# Patient Record
Sex: MALE | Race: WHITE | NOT HISPANIC OR LATINO | URBAN - METROPOLITAN AREA
[De-identification: names, ages, dates, MRNs, and addresses within clinical notes are randomized per-mention and may not be internally consistent; named-entity substitution may affect disease eponyms.]

---
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
## 2021-03-01 ENCOUNTER — OFFICE VISIT (OUTPATIENT)
Dept: PHYSICAL THERAPY | Facility: OTHER | Age: 21
End: 2021-03-01
Payer: COMMERCIAL

## 2021-03-01 DIAGNOSIS — M67.952 TENDINOPATHY OF LEFT GLUTEUS MEDIUS: Primary | ICD-10-CM

## 2021-03-01 PROCEDURE — 97110 THERAPEUTIC EXERCISES: CPT | Performed by: PHYSICAL THERAPIST

## 2021-03-01 PROCEDURE — 97162 PT EVAL MOD COMPLEX 30 MIN: CPT | Performed by: PHYSICAL THERAPIST

## 2021-03-01 NOTE — PROGRESS NOTES
PT Evaluation     Today's date: 3/1/2021  Patient name: Tito Parra  : 2000  MRN: [id number]  Referring provider: Vipul Zambrano PT  Dx:   Encounter Diagnosis     ICD-10-CM    1  Tendinopathy of left gluteus medius  M67 952        Start Time: 8800  Stop Time: 0110  Total time in clinic (min): 60 minutes    Assessment  Assessment details: Tito Parra is a pleasant 23 y o  male who presents with L hip pain, signs and symptoms consistent with glut med tendinipoathy  Current pathology liekly secondary to poor glut and core strength, decreased LE flexibility, and decreased TCJ mobility leading to altered gait mechanics (ER LE with medial heel whip) while running  The patients altered gait mechanics secondary to impairments listed above is likely the cause of his current hip pain and glut tendinopathy  The patient's greatest concerns are worry over not knowing what's wrong, concern at no signs of improvement, fear of not being able to keep active and future ill health (and wanting to prevent it)  The primary movement problem is L sided hip pain with movement coordination impairments limiting his ability to exercise or recreation and walk  No further referral appears necessary at this time based upon examination results  Primary Impairments:  1) decreased glut and core strength  2) decreased LE flexibility  3) hip pain        Impairments: abnormal gait, abnormal muscle firing, abnormal or restricted ROM, abnormal movement, activity intolerance, impaired physical strength, lacks appropriate home exercise program, pain with function and poor body mechanics    Symptom irritability: moderateUnderstanding of Dx/Px/POC: good   Prognosis: good  Prognosis details: Positive prognostic indicators include positive attitude toward recovery  Negative prognostic indicators include chronicity of symptoms, high symptom irritability        Goals  STG's to be achieved in 4 weeks:  1) Patient will have normal pain free AROM of L hip  2) Patient will improve hip and core strength by 1/2 muscle grade  3) Patient will be able to complete easy distance running 3-5 miles, 3 days per week with no greater than 2/10 hip pain  LTG's to be achieved in 8 weeks:  1) Patient will be independent and compliant with HEP  2) Patient will return to speed workouts and full training with the team 6 days per week with no greater than 2/10 hip pain  3) Patient will return to competition, running 8K with no greater than 2/10 hip pain  4) Patient will score 75 or greater on FOTO  Plan  Patient would benefit from: skilled physical therapy  Planned modality interventions: thermotherapy: hydrocollator packs  Planned therapy interventions: activity modification, joint mobilization, manual therapy, motor coordination training, neuromuscular re-education, patient education, self care, therapeutic activities, therapeutic exercise, graded activity, home exercise program and behavior modification  Frequency: 2x week  Duration in weeks: 8  Treatment plan discussed with: patient        Subjective Evaluation    History of Present Illness  Date of onset: 2/12/2021  Mechanism of injury: 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  Hung Mercer reports a chronic history if L sided LE pain  States that throughout his high school career he was relatively injury free  But, notes 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 he has had an increasing number of L sided LE overuse injuries  Reports nagging ITB issues, nagging hip and knee pain  Reports with the pandemic he took 2 months off from running to do nothing which helped his previous injuries  His most recent injury occurred approximately 3 weeks ago  He took 2 weeks off cross-training and noted little to no improvement in hip ITB pain  Patient reports he not been able to run more than 1 mile since injury   States that pain is still there when running a 1/2 mile a mile or more and is worried about injuring his hip further  Pain  Current pain ratin  At best pain ratin  At worst pain ratin          Objective     Palpation   Left   Tenderness of the gluteus medius  Active Range of Motion   Left Ankle/Foot   Dorsiflexion (ke): 5 degrees   Inversion: WFL  Eversion: WFL    Right Ankle/Foot   Dorsiflexion (ke): 5 degrees   Plantar flexion: with pain  Inversion: with pain  Eversion: with pain    Joint Play   Left Hip   Hypomobile in the posterior hip capsule, anterior hip capsule and lateral hip capsule  Right Hip     Hypomobile in the posterior hip capsule, anterior hip capsule and lateral hip capsule  Left Ankle/Foot  Hypomobile in the talocrural joint  Strength/Myotome Testing     Left Hip     Isolated Muscles   Gluteus ashley: 4-  Gluteus medius: 3-  Iliopsoas: 4    Right Hip     Isolated Muscles   Gluteus maximums: 4-  Gluteus medius: 3+  Iliopsoas: 4    Left Ankle/Foot   Dorsiflexion: 5  Plantar flexion: 5  Inversion: 5  Eversion: 5    Right Ankle/Foot   Dorsiflexion: 5  Plantar flexion: 5  Inversion: 5  Eversion: 5    Additional Strength Details  Patient unable to hold glut med strength testing against gravity- when placed in position reports attempting to hold against gravity reproduces his pain       General Comments:      Hip Comments   Running analysis:   Posterior view- excessive vertical displacement, trendelenburg b/l, knee valgus, ER lower extremity, medial heel whip  Lateral view- lack of DF and ankle lean,       Flowsheet Rows      Most Recent Value   PT/OT G-Codes   Current Score  59   Projected Score  82             Precautions: chronic R hip pain      Manuals 3/1            TCJ mob post             Hip IR and flexion mob             Cupping glut med                          Neuro Re-Ed             Single leg hip abd isometric 10 x 10"            Side plank with hip abduction             Sidestep CLX band             SL squat                                                    Ther Ex             bike             Piriformis stretch 4 x 30"            Sciatic nerve glides 10 x 5"            Self DF mobilization 10 x 10"                                                                             Ther Activity                                       Gait Training                                       Modalities

## 2021-03-04 ENCOUNTER — OFFICE VISIT (OUTPATIENT)
Dept: PHYSICAL THERAPY | Facility: OTHER | Age: 21
End: 2021-03-04
Payer: COMMERCIAL

## 2021-03-04 DIAGNOSIS — M67.952 TENDINOPATHY OF LEFT GLUTEUS MEDIUS: Primary | ICD-10-CM

## 2021-03-04 PROCEDURE — 97110 THERAPEUTIC EXERCISES: CPT | Performed by: PHYSICAL THERAPIST

## 2021-03-04 PROCEDURE — 97112 NEUROMUSCULAR REEDUCATION: CPT | Performed by: PHYSICAL THERAPIST

## 2021-03-04 PROCEDURE — 97140 MANUAL THERAPY 1/> REGIONS: CPT | Performed by: PHYSICAL THERAPIST

## 2021-03-04 NOTE — PROGRESS NOTES
Daily Note     Today's date: 3/4/2021  Patient name: Nikunj Means  : 2000  MRN: [id number]  Referring provider: Kevin Smith PT  Dx:   Encounter Diagnosis     ICD-10-CM    1  Tendinopathy of left gluteus medius  M67 952        Start Time:   Stop Time: 1630  Total time in clinic (min): 60 minutes  1 on 1 from 330-400, not billed remainder    Subjective: Patient reports compliance with home program  Reports he began running on the treadmill and completing a ten minute run then biking for the rest of his workout  Reports tightness returns in his hip and he is unable to run further than 10 minutes at this time  But, does note improvemen      Objective: See treatment diary below      Assessment: Tolerated treatment well  Patient demonstrated fatigue post treatment and would benefit from continued PT  Patient very easily fatigued with current program  Continued with EPAT  Plan: Continue per plan of care        Precautions: chronic R hip pain      Manuals 3/1 3/4           TCJ mob post             Hip IR and flexion mob  EB           Cupping glut med                          Neuro Re-Ed             Single leg hip abd isometric 10 x 10" 10 x 10"           Side plank with hip abduction  3 x 20" from knee           Sidestep CLX band             SL squat                                                    Ther Ex             bike             Piriformis stretch 4 x 30" 4 x 30"           Sciatic nerve glides 10 x 5" 10 x 5"           Self DF mobilization 10 x 10" 4 x 30"                                                                            Ther Activity                                       Gait Training                                       Modalities

## 2021-03-08 ENCOUNTER — OFFICE VISIT (OUTPATIENT)
Dept: PHYSICAL THERAPY | Facility: OTHER | Age: 21
End: 2021-03-08
Payer: COMMERCIAL

## 2021-03-08 DIAGNOSIS — M67.952 TENDINOPATHY OF LEFT GLUTEUS MEDIUS: Primary | ICD-10-CM

## 2021-03-08 PROCEDURE — 97112 NEUROMUSCULAR REEDUCATION: CPT | Performed by: PHYSICAL THERAPIST

## 2021-03-08 PROCEDURE — 97140 MANUAL THERAPY 1/> REGIONS: CPT | Performed by: PHYSICAL THERAPIST

## 2021-03-08 PROCEDURE — 97110 THERAPEUTIC EXERCISES: CPT | Performed by: PHYSICAL THERAPIST

## 2021-03-08 NOTE — PROGRESS NOTES
Daily Note     Today's date: 3/8/2021  Patient name: Carina Dela Cruz  : 2000  MRN: [id number]  Referring provider: Gianna Mars PT  Dx:   Encounter Diagnosis     ICD-10-CM    1  Tendinopathy of left gluteus medius  M67 952        Start Time: 47  Stop Time: 8060  Total time in clinic (min): 60 minutes  1 on 1 from [id number], not billed remainder    Subjective: Patient reports he continues to have tightness at approximately 1 mile terry of his run  Patient states he feels he should continue with cross training at this point to reduce risk of further aggravating his hip pain  Objective: See treatment diary below      Assessment: Tolerated treatment well  Patient demonstrated fatigue post treatment and would benefit from continued PT  Patient very easily fatigued with current program  Continued with EPAT  Hypomobility noted in throughout L LE (foot- hip)  Patient would benefit from continued PT to improve upon impairments listed above 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  Plan: Continue per plan of care        Precautions: chronic R hip pain      Manuals 3/1 3/4 3/8          TCJ mob post   EB          Hip IR and flexion mob  EB EB          Cupping glut med                          Neuro Re-Ed             Single leg hip abd isometric 10 x 10" 10 x 10" 5 x 20"          Side plank with hip abduction  3 x 20" from knee 4 x 20"          Sidestep CLX band             SL squat, barefoot, short foot   3 x 10          SL RDL, barefoot, short foot   3 x 10                                    Ther Ex             bike   10'          Piriformis stretch 4 x 30" 4 x 30" 4 x 30"          Sciatic nerve glides 10 x 5" 10 x 5" 4 x 30"          Self DF mobilization 10 x 10" 4 x 30" 4 x 30"                                                                           Ther Activity                                       Gait Training                                       Modalities

## 2021-03-11 ENCOUNTER — OFFICE VISIT (OUTPATIENT)
Dept: PHYSICAL THERAPY | Facility: OTHER | Age: 21
End: 2021-03-11
Payer: COMMERCIAL

## 2021-03-11 DIAGNOSIS — M67.952 TENDINOPATHY OF LEFT GLUTEUS MEDIUS: Primary | ICD-10-CM

## 2021-03-11 PROCEDURE — 97110 THERAPEUTIC EXERCISES: CPT | Performed by: PHYSICAL THERAPIST

## 2021-03-11 PROCEDURE — 97112 NEUROMUSCULAR REEDUCATION: CPT | Performed by: PHYSICAL THERAPIST

## 2021-03-11 PROCEDURE — 97140 MANUAL THERAPY 1/> REGIONS: CPT | Performed by: PHYSICAL THERAPIST

## 2021-03-11 NOTE — PROGRESS NOTES
Daily Note     Today's date: 3/11/2021  Patient name: Tito Parra  : 2000  MRN: [id number]  Referring provider: Vipul Zambrano PT  Dx:   Encounter Diagnosis     ICD-10-CM    1  Tendinopathy of left gluteus medius  M67 952        Start Time:   Stop Time: 1726  Total time in clinic (min): 71 minutes  1 on 1 from 425-450, not billed remainder    Subjective: Patient reports he was able to complete a modified workout of a 1 mile warm up and a 2 mile and 1 mile workout  Patient reports he did not complete the remainder of the 2 mile repeats due to onset of hip pain  But, notes this is the best he has felt since starting PT  Objective: See treatment diary below      Assessment: Tolerated treatment well  Patient demonstrated fatigue post treatment and would benefit from continued PT  Patient very easily fatigued with current program  Continued with EPAT  Hypomobility noted in throughout L LE (foot- hip)  Patient would benefit from continued PT to improve upon impairments listed above 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  Plan: Continue per plan of care        Precautions: chronic R hip pain      Manuals 3/1 3/4 3/8 3/11         TCJ mob post   EB EB         Hip IR and flexion mob  EB EB EB         Cupping glut med                          Neuro Re-Ed             Single leg hip abd isometric 10 x 10" 10 x 10" 5 x 20" 5 x 20"         Side plank with hip abduction  3 x 20" from knee 4 x 20" 4 x 20"         Sidestep CLX band    4 laps black         SL squat, barefoot, short foot   3 x 10 3 x 10         SL RDL, barefoot, short foot   3 x 10 3 x 10          [facility]  to 8 in step    4 laps                      Ther Ex             bike   10' 7'          Piriformis stretch 4 x 30" 4 x 30" 4 x 30" 4 x 30"         Sciatic nerve glides 10 x 5" 10 x 5" 4 x 30" 4 x 30"         Self DF mobilization 10 x 10" 4 x 30" 4 x 30" 4 x 30" Ther Activity                                       Gait Training                                       Modalities

## 2021-03-15 ENCOUNTER — OFFICE VISIT (OUTPATIENT)
Dept: PHYSICAL THERAPY | Facility: OTHER | Age: 21
End: 2021-03-15
Payer: COMMERCIAL

## 2021-03-15 DIAGNOSIS — M67.952 TENDINOPATHY OF LEFT GLUTEUS MEDIUS: Primary | ICD-10-CM

## 2021-03-15 PROCEDURE — 97110 THERAPEUTIC EXERCISES: CPT | Performed by: PHYSICAL THERAPIST

## 2021-03-15 PROCEDURE — 97112 NEUROMUSCULAR REEDUCATION: CPT | Performed by: PHYSICAL THERAPIST

## 2021-03-15 PROCEDURE — 97140 MANUAL THERAPY 1/> REGIONS: CPT | Performed by: PHYSICAL THERAPIST

## 2021-03-15 NOTE — PROGRESS NOTES
Daily Note     Today's date: 3/15/2021  Patient name: Lucrecia Saleh  : 2000  MRN: [id number]  Referring provider: Mario Gonzalez, PT  Dx:   Encounter Diagnosis     ICD-10-CM    1  Tendinopathy of left gluteus medius  M67 952        Start Time:   Stop Time: 8819  Total time in clinic (min): 60 minutes  1 on 1 from 425-500pm, not billed remainder    Subjective: Patient reports he was able to complete a modified workout of a 1 mile warm up and a 2 mile and 1 mile workout  Patient reports he did not complete the remainder of the 2 mile repeats due to onset of hip pain  But, notes this is the best he has felt since starting PT  Objective: See treatment diary below      Assessment: Tolerated treatment well  Patient demonstrated fatigue post treatment and would benefit from continued PT  Patient appropriately challenged with current POC  Able to progress hold times for planks and SL hip abduction isometric  Patient very fatigued with hip progression  Continued with EPAT  Hypomobility noted in throughout L LE (foot- hip)  Patient would benefit from continued PT to improve upon impairments listed above 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  Plan: Continue per plan of care        Precautions: chronic R hip pain      Manuals 3/1 3/4 3/8 3/11 3/15        TCJ mob post   EB EB EB        Hip IR and flexion mob  EB EB EB EB        Cupping glut med                          Neuro Re-Ed             Single leg hip abd isometric 10 x 10" 10 x 10" 5 x 20" 5 x 20" 4 x 30"        Side plank with hip abduction  3 x 20" from knee 4 x 20" 4 x 20" 4 x 25"        Sidestep CLX band    4 laps black 4 laps black        SL squat, barefoot, short foot   3 x 10 3 x 10 3 x 10        SL RDL, barefoot, short foot   3 x 10 3 x 10  3 x 10        [facility]  to 8 in step    4 laps 5 laps                     Ther Ex             bike   10' 7'  10'         Piriformis stretch 4 x 30" 4 x 30" 4 x 30" 4 x 30" 4 x 30"        Sciatic nerve glides 10 x 5" 10 x 5" 4 x 30" 4 x 30" 4 x 30"        Self DF mobilization 10 x 10" 4 x 30" 4 x 30" 4 x 30" 4 x 30"                                                                         Ther Activity                                       Gait Training                                       Modalities

## 2021-03-18 ENCOUNTER — OFFICE VISIT (OUTPATIENT)
Dept: PHYSICAL THERAPY | Facility: OTHER | Age: 21
End: 2021-03-18
Payer: COMMERCIAL

## 2021-03-18 DIAGNOSIS — M67.952 TENDINOPATHY OF LEFT GLUTEUS MEDIUS: Primary | ICD-10-CM

## 2021-03-18 PROCEDURE — 97110 THERAPEUTIC EXERCISES: CPT | Performed by: PHYSICAL THERAPIST

## 2021-03-18 PROCEDURE — 97140 MANUAL THERAPY 1/> REGIONS: CPT | Performed by: PHYSICAL THERAPIST

## 2021-03-18 PROCEDURE — 97112 NEUROMUSCULAR REEDUCATION: CPT | Performed by: PHYSICAL THERAPIST

## 2021-03-18 NOTE — PROGRESS NOTES
Daily Note     Today's date: 3/18/2021  Patient name: Carina Dela Cruz  : 2000  MRN: [id number]  Referring provider: Gianna Mars, PT  Dx:   Encounter Diagnosis     ICD-10-CM    1  Tendinopathy of left gluteus medius  M67 952        Start Time: 3491  Stop Time: 1730  Total time in clinic (min): 75 minutes  1 on 1 from 428-452pm, not billed remainder    Subjective: Patient reports he completed a distance run of 20-30 min 2 x this week  States that he took yesterday off  But, overall is noting significant improvement of hip pain  Objective: See treatment diary below      Assessment: Tolerated treatment well  Patient demonstrated fatigue post treatment and would benefit from continued PT  Patient appropriately challenged with current POC  Able to progress hold times for planks and SL hip abduction isometric  Patient very fatigued with hip progression  Continued with EPAT  Hypomobility noted in throughout L LE (foot- hip)  Patient would benefit from continued PT to improve upon impairments listed above 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  Plan: Continue per plan of care        Precautions: chronic R hip pain      Manuals 3/1 3/4 3/8 3/11 3/15 3/18       TCJ mob post   EB EB EB EB       Hip IR and flexion mob  EB EB EB EB EB       Cupping glut med                          Neuro Re-Ed             Single leg hip abd isometric 10 x 10" 10 x 10" 5 x 20" 5 x 20" 4 x 30" 4 x 30"       Side plank with hip abduction  3 x 20" from knee 4 x 20" 4 x 20" 4 x 25" 4 x 25"       Sidestep CLX band    4 laps black 4 laps black 4 laps black       SL squat, barefoot, short foot   3 x 10 3 x 10 3 x 10 3 x 10, 10#       SL RDL, barefoot, short foot   3 x 10 3 x 10  3 x 10 3 x 10, 10#       [facility]  to 8 in step    4 laps 5 laps 5 laps       Elevated quadruped pull through      3 x 5 10#       Ther Ex             bike   10' 7'  10'  10'        Piriformis stretch 4 x 30" 4 x 30" 4 x 30" 4 x 30" 4 x 30" 4 x30"       Sciatic nerve glides 10 x 5" 10 x 5" 4 x 30" 4 x 30" 4 x 30" 4 x 30"       Self DF mobilization 10 x 10" 4 x 30" 4 x 30" 4 x 30" 4 x 30" 4 x 30"                                                                        Ther Activity                                       Gait Training                                       Modalities

## 2021-03-22 ENCOUNTER — OFFICE VISIT (OUTPATIENT)
Dept: PHYSICAL THERAPY | Facility: OTHER | Age: 21
End: 2021-03-22
Payer: COMMERCIAL

## 2021-03-22 DIAGNOSIS — M67.952 TENDINOPATHY OF LEFT GLUTEUS MEDIUS: Primary | ICD-10-CM

## 2021-03-22 PROCEDURE — 97140 MANUAL THERAPY 1/> REGIONS: CPT | Performed by: PHYSICAL THERAPIST

## 2021-03-22 PROCEDURE — 97112 NEUROMUSCULAR REEDUCATION: CPT | Performed by: PHYSICAL THERAPIST

## 2021-03-22 PROCEDURE — 97110 THERAPEUTIC EXERCISES: CPT | Performed by: PHYSICAL THERAPIST

## 2021-03-22 NOTE — PROGRESS NOTES
Daily Note     Today's date: 3/22/2021  Patient name: Manolo Canela  : 2000  MRN: [id number]  Referring provider: Kb Celis PT  Dx:   Encounter Diagnosis     ICD-10-CM    1  Tendinopathy of left gluteus medius  M67 952        Start Time:   Stop Time: 1231  Total time in clinic (min): 50 minutes  1 on 1 from 430-500pm, not billed remainder    Subjective: Patient reports 95% improvement in hip pain  Reports he was able to complete a speed workout without any difficulty  Reports he is eager to try a workout today  Plans to race this  Feel confident in d/c to HEP at this time    Objective: See treatment diary below      Assessment: Tolerated treatment well  Patient demonstrated fatigue post treatment and would benefit from continued PT  Patient appropriately challenged with current POC  Patient with improved gait mechanics, less toeing out, no overstriding, no trunk lean, pain free  Patient with good tolerance to current program and independent with current program  Discussed importance of continued mobility of hip and ankle and continued strengthening of glut and core to prevent future injury  Plan: Continue per plan of care        Precautions: chronic R hip pain      Manuals 3/1 3/4 3/8 3/11 3/15 3/18 3/22      TCJ mob post   EB EB EB EB EB      Hip IR and flexion mob  EB EB EB EB EB EB      Cupping glut med                          Neuro Re-Ed             Single leg hip abd isometric 10 x 10" 10 x 10" 5 x 20" 5 x 20" 4 x 30" 4 x 30" 4 x 30"      Side plank with hip abduction  3 x 20" from knee 4 x 20" 4 x 20" 4 x 25" 4 x 25" 4 x 25"      Sidestep CLX band    4 laps black 4 laps black 4 laps black 4 laps black      SL squat, barefoot, short foot   3 x 10 3 x 10 3 x 10 3 x 10, 10# 3 x 10, 10#      SL RDL, barefoot, short foot   3 x 10 3 x 10  3 x 10 3 x 10, 10# 3 x 10, 10#      [facility]  to 8 in step    4 laps 5 laps 5 laps 5 laps      Elevated quadruped pull through      3 x 5 10# 3 x5 10#      Ther Ex             bike   10' 7'  10'  10'  10'      Piriformis stretch 4 x 30" 4 x 30" 4 x 30" 4 x 30" 4 x 30" 4 x30" 4 x 30"      Sciatic nerve glides 10 x 5" 10 x 5" 4 x 30" 4 x 30" 4 x 30" 4 x 30" 10 x 10"      Self DF mobilization 10 x 10" 4 x 30" 4 x 30" 4 x 30" 4 x 30" 4 x 30" 4 x 30"                                                                       Ther Activity                                       Gait Training                                       Modalities

## 2021-03-25 ENCOUNTER — APPOINTMENT (OUTPATIENT)
Dept: PHYSICAL THERAPY | Facility: OTHER | Age: 21
End: 2021-03-25
Payer: COMMERCIAL

## 2021-03-29 ENCOUNTER — APPOINTMENT (OUTPATIENT)
Dept: PHYSICAL THERAPY | Facility: OTHER | Age: 21
End: 2021-03-29
Payer: COMMERCIAL

## 2021-12-29 ENCOUNTER — IMMUNIZATIONS (OUTPATIENT)
Dept: FAMILY MEDICINE CLINIC | Facility: HOSPITAL | Age: 21
End: 2021-12-29

## 2021-12-29 DIAGNOSIS — Z23 ENCOUNTER FOR IMMUNIZATION: Primary | ICD-10-CM

## 2021-12-29 PROCEDURE — 0064A COVID-19 MODERNA VACC 0.25 ML BOOSTER: CPT

## 2021-12-29 PROCEDURE — 91306 COVID-19 MODERNA VACC 0.25 ML BOOSTER: CPT

## 2023-06-21 ENCOUNTER — OFFICE VISIT (OUTPATIENT)
Dept: URGENT CARE | Age: 23
End: 2023-06-21
Payer: COMMERCIAL

## 2023-06-21 VITALS
HEART RATE: 51 BPM | DIASTOLIC BLOOD PRESSURE: 80 MMHG | RESPIRATION RATE: 16 BRPM | SYSTOLIC BLOOD PRESSURE: 140 MMHG | TEMPERATURE: 98.4 F | OXYGEN SATURATION: 98 %

## 2023-06-21 DIAGNOSIS — K64.9 HEMORRHOIDS, UNSPECIFIED HEMORRHOID TYPE: Primary | ICD-10-CM

## 2023-06-21 PROCEDURE — S9083 URGENT CARE CENTER GLOBAL: HCPCS

## 2023-06-21 PROCEDURE — G0382 LEV 3 HOSP TYPE B ED VISIT: HCPCS

## 2023-06-21 NOTE — PATIENT INSTRUCTIONS
Please trial OTC topical such as Preparation-H, Witch Hazel Extract  Follow up with PCP as discussed

## 2023-06-21 NOTE — PROGRESS NOTES
[facility]        NAME: Amber Peoples is a 24 y o  male  : 2000    MRN: [id number]  DATE: 2023  TIME: 6:15 PM    Assessment and Plan   Hemorrhoids, unspecified hemorrhoid type [K64 9]  1  Hemorrhoids, unspecified hemorrhoid type  Ambulatory Referral to Family Practice      Please trial OTC topical such as Preparation-H, [employer]  Follow up with PCP as discussed  Patient Instructions   Hemorrhoids   WHAT YOU NEED TO KNOW:   Hemorrhoids are swollen blood vessels inside your rectum (internal hemorrhoids) or on your anus (external hemorrhoids)  Sometimes a hemorrhoid may prolapse  This means it extends out of your anus  DISCHARGE INSTRUCTIONS:   Return to the emergency department if:   • You have severe pain in your rectum or around your anus      • You have severe pain in your abdomen and you are vomiting       • You have bleeding from your anus that soaks through your underwear      Contact your healthcare provider if:   • You have frequent and painful bowel movements      • Your hemorrhoid looks or feels more swollen than usual       • You do not have a bowel movement for 2 days or more       • You see or feel tissue coming through your anus       • You have questions or concerns about your condition or care      Medicines: You may  need any of the following:  • A pad, cream, or ointment  can help decrease pain, swelling, and itching       • Stool softeners  help treat or prevent constipation       • NSAIDs , such as ibuprofen, help decrease swelling, pain, and fever  NSAIDs can cause stomach bleeding or kidney problems in certain people  If you take blood thinner medicine, always ask your healthcare provider if NSAIDs are safe for you  Always read the medicine label and follow directions      • Take your medicine as directed  Contact your healthcare provider if you think your medicine is not helping or if you have side effects   Tell your provider if you are allergic to any medicine  Keep a list of the medicines, vitamins, and herbs you take  Include the amounts, and when and why you take them  Bring the list or the pill bottles to follow-up visits  Carry your medicine list with you in case of an emergency      Manage your symptoms:   • Apply ice on your anus for 15 to 20 minutes every hour or as directed  Use an ice pack, or put crushed ice in a plastic bag  Cover it with a towel before you apply it to your anus  Ice helps prevent tissue damage and decreases swelling and pain      • Take a sitz bath  Fill a bathtub with 4 to 6 inches of warm water  You may also use a sitz bath pan that fits inside a toilet bowl  Sit in the sitz bath for 15 minutes  Do this 3 times a day, and after each bowel movement  The warm water can help decrease pain and swelling       • Keep your anal area clean  Gently wash the area with warm water daily  Soap may irritate the area  After a bowel movement, wipe with moist towelettes or wet toilet paper  Dry toilet paper can irritate the area      Prevent hemorrhoids:   • Do not strain to have a bowel movement  Do not sit on the toilet too long  These actions can increase pressure on the tissues in your rectum and anus       • Drink plenty of liquids  Liquids can help prevent constipation  Ask how much liquid to drink each day and which liquids are best for you       • Eat a variety of high-fiber foods  Examples include fruits, vegetables, and whole grains  Ask your healthcare provider how much fiber you need each day  You may need to take a fiber supplement           • Exercise as directed  Exercise, such as walking, may make it easier to have a bowel movement  Ask your healthcare provider to help you create an exercise plan       • Do not have anal sex  Anal sex can weaken the skin around your rectum and anus       • Avoid heavy lifting    This can cause straining and increase your risk for another hemorrhoid      Follow up with your doctor as directed:  Write down your questions so you remember to ask them during your visits  © Marilyn [employer] 2022 Information is for End User's use only and may not be sold, redistributed or otherwise used for commercial purposes  The above information is an  only  It is not intended as medical advice for individual conditions or treatments  Talk to your doctor, nurse or pharmacist before following any medical regimen to see if it is safe and effective for you  Follow up with PCP in 3-5 days  Proceed to  ER if symptoms worsen  Chief Complaint   No chief complaint on file  History of Present Illness       Patient is a 21-year-old male with no significant past medical history who presents for evaluation of possible hemorrhoid  He reports that a week ago he was lifting some heavy furniture while moving and about an hour later while in the shower he noticed a lump around his rectum  He does report that  he has had some mild discomfort during bowel movements but otherwise denies rectal pain, pruritus, bleeding  He denies any recent insertional  activity  He reports bowel movements are regular and denies constipation  He has not tried anything for his symptoms  Review of Systems   Review of Systems   Constitutional: Negative for fatigue and fever  HENT: Negative for congestion, ear discharge, ear pain, postnasal drip, rhinorrhea, sinus pressure, sinus pain, sneezing and sore throat  Eyes: Negative  Negative for pain, discharge, redness and itching  Respiratory: Negative  Negative for apnea, cough, choking, chest tightness, shortness of breath, wheezing and stridor  Cardiovascular: Negative  Negative for chest pain and palpitations  Gastrointestinal: Positive for rectal pain  Negative for abdominal distention, abdominal pain, anal bleeding, blood in stool, constipation, diarrhea, nausea and vomiting  Endocrine: Negative    Negative for polydipsia, polyphagia and polyuria  Genitourinary: Negative  Negative for decreased urine volume and flank pain  Musculoskeletal: Negative  Negative for arthralgias, back pain, myalgias, neck pain and neck stiffness  Skin: Negative  Negative for color change and rash  Allergic/Immunologic: Negative  Negative for environmental allergies  Neurological: Negative  Negative for dizziness, facial asymmetry, light-headedness, numbness and headaches  Hematological: Negative  Negative for adenopathy  Psychiatric/Behavioral: Negative  Current Medications     No current outpatient medications on file  Current Allergies     Allergies as of 06/21/2023   • (No Known Allergies)            The following portions of the patient's history were reviewed and updated as appropriate: allergies, current medications, past family history, past medical history, past social history, past surgical history and problem list      No past medical history on file  No past surgical history on file  No family history on file  Medications have been verified  Objective   /80   Pulse (!) 51   Temp 98 4 °F (36 9 °C)   Resp 16   SpO2 98%        Physical Exam     Physical Exam  Vitals reviewed  Exam conducted with a chaperone present  Constitutional:       General: He is not in acute distress  Appearance: Normal appearance  He is not ill-appearing, toxic-appearing or diaphoretic  Interventions: He is not intubated  HENT:      Head: Normocephalic and atraumatic  Right Ear: External ear normal  There is no impacted cerumen  Left Ear: External ear normal  There is no impacted cerumen  Nose: Nose normal  No congestion or rhinorrhea  Mouth/Throat:      Mouth: Mucous membranes are moist       Pharynx: Oropharynx is clear  Uvula midline  No pharyngeal swelling, oropharyngeal exudate, posterior oropharyngeal erythema or uvula swelling  Tonsils: No tonsillar exudate or tonsillar abscesses   1+ on the right  1+ on the left  Eyes:      Extraocular Movements: Extraocular movements intact  Conjunctiva/sclera: Conjunctivae normal       Pupils: Pupils are equal, round, and reactive to light  Cardiovascular:      Rate and Rhythm: Normal rate and regular rhythm  Pulses: Normal pulses  Heart sounds: Normal heart sounds, S1 normal and S2 normal  Heart sounds not distant  No murmur heard  No friction rub  No gallop  Pulmonary:      Effort: Pulmonary effort is normal  No tachypnea, bradypnea, accessory muscle usage, prolonged expiration, respiratory distress or retractions  He is not intubated  Breath sounds: Normal breath sounds  No stridor, decreased air movement or transmitted upper airway sounds  No decreased breath sounds, wheezing, rhonchi or rales  Chest:      Chest wall: No tenderness  Genitourinary:     Rectum: External hemorrhoid present  Comments: Small external hemorrhoid noted at 6:00 position of anus  (-) Bleeding, drainage  *External exam performed only without internal exam    Musculoskeletal:         General: Normal range of motion  Cervical back: Normal range of motion and neck supple  No rigidity or tenderness  Lymphadenopathy:      Cervical: No cervical adenopathy  Skin:     General: Skin is warm and dry  Capillary Refill: Capillary refill takes less than 2 seconds  Findings: No erythema  Neurological:      General: No focal deficit present  Mental Status: He is alert     Psychiatric:         Mood and Affect: Mood normal

## 2024-03-22 ENCOUNTER — RA CDI HCC (OUTPATIENT)
Dept: OTHER | Facility: HOSPITAL | Age: 24
End: 2024-03-22

## 2024-03-29 ENCOUNTER — OFFICE VISIT (OUTPATIENT)
Dept: INTERNAL MEDICINE CLINIC | Facility: CLINIC | Age: 24
End: 2024-03-29
Payer: COMMERCIAL

## 2024-03-29 VITALS
WEIGHT: 163 LBS | OXYGEN SATURATION: 98 % | DIASTOLIC BLOOD PRESSURE: 64 MMHG | BODY MASS INDEX: 20.92 KG/M2 | HEART RATE: 63 BPM | SYSTOLIC BLOOD PRESSURE: 122 MMHG | HEIGHT: 74 IN

## 2024-03-29 DIAGNOSIS — Z00.00 ANNUAL PHYSICAL EXAM: Primary | ICD-10-CM

## 2024-03-29 DIAGNOSIS — R09.89 ABDOMINAL AORTIC PULSATION: ICD-10-CM

## 2024-03-29 PROCEDURE — 99385 PREV VISIT NEW AGE 18-39: CPT | Performed by: INTERNAL MEDICINE

## 2024-03-29 RX ORDER — EMTRICITABINE AND TENOFOVIR ALAFENAMIDE 200; 25 MG/1; MG/1
TABLET ORAL
COMMUNITY
Start: 2022-11-01

## 2024-03-29 NOTE — PATIENT INSTRUCTIONS
-Contact Nov to get a copy of your recent labs     Wellness Visit for Adults   AMBULATORY CARE:   A wellness visit  is when you see your healthcare provider to get screened for health problems. Your healthcare provider will also give you advice on how to stay healthy. Write down your questions so you remember to ask them. Ask your healthcare provider how often you should have a wellness visit.  What happens at a wellness visit:  Your healthcare provider will ask about your health, and your family history of health problems. This includes high blood pressure, heart disease, and cancer. He or she will ask if you have symptoms that concern you, if you smoke, and about your mood. You may also be asked about your intake of medicines, supplements, food, and alcohol. Any of the following may be done:  Your weight  will be checked. Your height may also be checked so your body mass index (BMI) can be calculated. Your BMI shows if you are at a healthy weight.    Your blood pressure  and heart rate will be checked. Your temperature may also be checked.    Blood and urine tests  may be done. Blood tests may be done to check your cholesterol levels. Abnormal cholesterol levels increase your risk for heart disease and stroke. You may also need a blood or urine test to check for diabetes if you are at increased risk. Urine tests may be done to look for signs of an infection or kidney disease.    A physical exam  includes checking your heartbeat and lungs with a stethoscope. Your healthcare provider may also check your skin to look for sun damage.    Screening tests  may be recommended. A screening test is done to check for diseases that may not cause symptoms. The screening tests you may need depend on your age, gender, family history, and lifestyle habits. For example, colorectal screening may be recommended if you are 50 years old or older.    Screening tests you need if you are a woman:   A Pap smear  is used to screen for  cervical cancer. Pap smears are usually done every 3 to 5 years depending on your age. You may need them more often if you have had abnormal Pap smear test results in the past. Ask your healthcare provider how often you should have a Pap smear.    A mammogram  is an x-ray of your breasts to screen for breast cancer. Experts recommend mammograms every 2 years starting at age 50 years. You may need a mammogram at age 49 years or younger if you have an increased risk for breast cancer. Talk to your healthcare provider about when you should start having mammograms and how often you need them.    Vaccines you may need:   Get an influenza vaccine  every year. The influenza vaccine protects you from the flu. Several types of viruses cause the flu. The viruses change over time, so new vaccines are made each year.    Get a tetanus-diphtheria (Td) booster vaccine  every 10 years. This vaccine protects you against tetanus and diphtheria. Tetanus is a severe infection that may cause painful muscle spasms and lockjaw. Diphtheria is a severe bacterial infection that causes a thick covering in the back of your mouth and throat.    Get a human papillomavirus (HPV) vaccine  if you are female and aged 19 to 26 or male 19 to 21 and never received it. This vaccine protects you from HPV infection. HPV is the most common infection spread by sexual contact. HPV may also cause vaginal, penile, and anal cancers.    Get a pneumococcal vaccine  if you are aged 65 years or older. The pneumococcal vaccine is an injection given to protect you from pneumococcal disease. Pneumococcal disease is an infection caused by pneumococcal bacteria. The infection may cause pneumonia, meningitis, or an ear infection.    Get a shingles vaccine  if you are 60 or older, even if you have had shingles before. The shingles vaccine is an injection to protect you from the varicella-zoster virus. This is the same virus that causes chickenpox. Shingles is a painful  rash that develops in people who had chickenpox or have been exposed to the virus.    How to eat healthy:  My Plate is a model for planning healthy meals. It shows the types and amounts of foods that should go on your plate. Fruits and vegetables make up about half of your plate, and grains and protein make up the other half. A serving of dairy is included on the side of your plate. The amount of calories and serving sizes you need depends on your age, gender, weight, and height. Examples of healthy foods are listed below:  Eat a variety of vegetables  such as dark green, red, and orange vegetables. You can also include canned vegetables low in sodium (salt) and frozen vegetables without added butter or sauces.    Eat a variety of fresh fruits , canned fruit in 100% juice, frozen fruit, and dried fruit.    Include whole grains.  At least half of the grains you eat should be whole grains. Examples include whole-wheat bread, wheat pasta, brown rice, and whole-grain cereals such as oatmeal.    Eat a variety of protein foods such as seafood (fish and shellfish), lean meat, and poultry without skin (turkey and chicken). Examples of lean meats include pork leg, shoulder, or tenderloin, and beef round, sirloin, tenderloin, and extra lean ground beef. Other protein foods include eggs and egg substitutes, beans, peas, soy products, nuts, and seeds.    Choose low-fat dairy products such as skim or 1% milk or low-fat yogurt, cheese, and cottage cheese.    Limit unhealthy fats  such as butter, hard margarine, and shortening.       Exercise:  Exercise at least 30 minutes per day on most days of the week. Some examples of exercise include walking, biking, dancing, and swimming. You can also fit in more physical activity by taking the stairs instead of the elevator or parking farther away from stores. Include muscle strengthening activities 2 days each week. Regular exercise provides many health benefits. It helps you manage your  weight, and decreases your risk for type 2 diabetes, heart disease, stroke, and high blood pressure. Exercise can also help improve your mood. Ask your healthcare provider about the best exercise plan for you.       General health and safety guidelines:   Do not smoke.  Nicotine and other chemicals in cigarettes and cigars can cause lung damage. Ask your healthcare provider for information if you currently smoke and need help to quit. E-cigarettes or smokeless tobacco still contain nicotine. Talk to your healthcare provider before you use these products.    Limit alcohol.  A drink of alcohol is 12 ounces of beer, 5 ounces of wine, or 1½ ounces of liquor.    Lose weight, if needed.  Being overweight increases your risk of certain health conditions. These include heart disease, high blood pressure, type 2 diabetes, and certain types of cancer.    Protect your skin.  Do not sunbathe or use tanning beds. Use sunscreen with a SPF 15 or higher. Apply sunscreen at least 15 minutes before you go outside. Reapply sunscreen every 2 hours. Wear protective clothing, hats, and sunglasses when you are outside.    Drive safely.  Always wear your seatbelt. Make sure everyone in your car wears a seatbelt. A seatbelt can save your life if you are in an accident. Do not use your cell phone when you are driving. This could distract you and cause an accident. Pull over if you need to make a call or send a text message.    Practice safe sex.  Use latex condoms if are sexually active and have more than one partner. Your healthcare provider may recommend screening tests for sexually transmitted infections (STIs).    Wear helmets, lifejackets, and protective gear.  Always wear a helmet when you ride a bike or motorcycle, go skiing, or play sports that could cause a head injury. Wear protective equipment when you play sports. Wear a lifejacket when you are on a boat or doing water sports.    © Copyright [employer] 2023 Information is for End  User's use only and may not be sold, redistributed or otherwise used for commercial purposes.  The above information is an  only. It is not intended as medical advice for individual conditions or treatments. Talk to your doctor, nurse or pharmacist before following any medical regimen to see if it is safe and effective for you.

## 2024-03-29 NOTE — ASSESSMENT & PLAN NOTE
-Patient with a prominent abdominal pulsation on exam.  An ultrasound has been ordered to rule out underlying aneurysm.

## 2024-03-29 NOTE — PROGRESS NOTES
ADULT ANNUAL PHYSICAL  [facility]    NAME: Remy Bishop  AGE: 24 y.o. SEX: male  : 2000     DATE: 3/29/2024     Assessment and Plan:     Problem List Items Addressed This Visit     Abdominal aortic pulsation     -Patient with a prominent abdominal pulsation on exam.  An ultrasound has been ordered to rule out underlying aneurysm.         Relevant Orders    US abdominal aorta   Other Visit Diagnoses     Annual physical exam    -  Primary            Immunizations and preventive care screenings were discussed with patient today. Appropriate education was printed on patient's after visit summary.         Return in about 1 year (around 3/29/2025) for Annual physical.     Chief Complaint:     Chief Complaint   Patient presents with   • Establish Care      History of Present Illness:     Adult Annual Physical   Patient here for a comprehensive physical exam and to establish care.  The patient reports he is currently on Descovy and that this is prescribed and monitored through [facility].  Overall he states he is doing well and has no major complaints.  The patient reports he has a prominent pulse in his abdomen.  He states 3 to 4 years ago he underwent an ultrasound and believes that the results of the study were normal.    In general the patient reports he gets 6 to 9 hours of sleep at night.  He denies any issues with his vision.  He is in the process of establishing care with a new dentist.  He feels his diet is well-balanced and he exercises 4 to 7 days a week.       Depression Screening  PHQ-2/9 Depression Screening    Little interest or pleasure in doing things: 0 - not at all  Feeling down, depressed, or hopeless: 0 - not at all  PHQ-2 Score: 0  PHQ-2 Interpretation: Negative depression screen          Review of Systems:     Review of Systems  All other systems negative except for pertinent findings noted in HPI.      Past Medical History:     History  "reviewed. No pertinent past medical history.   Past Surgical History:     History reviewed. No pertinent surgical history.   Social History:     Social History     Socioeconomic History   • Marital status: Single     Spouse name: None   • Number of children: None   • Years of education: None   • Highest education level: None   Occupational History   • None   Tobacco Use   • Smoking status: Never     Passive exposure: Never   • Smokeless tobacco: Never   Vaping Use   • Vaping status: Never Used   Substance and Sexual Activity   • Alcohol use: Yes     Alcohol/week: 3.0 standard drinks of alcohol     Types: 2 Glasses of wine, 1 Cans of beer per week   • Drug use: Never   • Sexual activity: Yes     Partners: Male     Birth control/protection: None   Other Topics Concern   • None   Social History Narrative   • None     Social Determinants of Health     Financial Resource Strain: Not on file   Food Insecurity: Not on file   Transportation Needs: Not on file   Physical Activity: Not on file   Stress: Not on file   Social Connections: Not on file   Intimate Partner Violence: Not on file   Housing Stability: Not on file      Family History:     Family History   Problem Relation Age of Onset   • Hypertension Mother    • Multiple sclerosis Father    • Colon cancer Maternal Grandmother    • Colon cancer Maternal Aunt       Current Medications:     Current Outpatient Medications   Medication Sig Dispense Refill   • Descovy 200-25 MG tablet        No current facility-administered medications for this visit.      Allergies:     No Known Allergies   Physical Exam:     /64 (BP Location: Left arm, Patient Position: Sitting, Cuff Size: Standard)   Pulse 63   Ht 6' 2\" (1.88 m)   Wt 73.9 kg (163 lb)   SpO2 98%   BMI 20.93 kg/m²     Physical Exam   General: NAD  HEENT: NCAT, EOMI, normal conjunctiva  Cardiovascular: RRR, normal S1 and S2, no m/r/g  Pulmonary: Normal respiratory effort, no wheezes, rales or rhonchi  GI: Soft, " nontender, prominent abdominal pulsation   Musculoskeletal: Normal bulk and tone  Neuro: Non-focal, ambulating without difficulty, non-antalgic gait  Extremities: No lower extremity edema  Skin: Normal skin color, no rashes   Psychiatric: Normal mood and affect    Yash Bazzi MD   [facility]

## 2024-04-12 ENCOUNTER — HOSPITAL ENCOUNTER (OUTPATIENT)
Dept: ULTRASOUND IMAGING | Facility: HOSPITAL | Age: 24
Discharge: HOME | End: 2024-04-12
Attending: INTERNAL MEDICINE
Payer: COMMERCIAL

## 2024-04-12 DIAGNOSIS — R09.89 ABDOMINAL AORTIC PULSATION: ICD-10-CM

## 2024-04-12 PROCEDURE — 76775 US EXAM ABDO BACK WALL LIM: CPT

## 2024-04-19 ENCOUNTER — PATIENT MESSAGE (OUTPATIENT)
Dept: INTERNAL MEDICINE CLINIC | Facility: CLINIC | Age: 24
End: 2024-04-19

## 2024-04-19 DIAGNOSIS — F41.9 ANXIETY: Primary | ICD-10-CM

## 2024-10-14 ENCOUNTER — TELEPHONE (OUTPATIENT)
Age: 24
End: 2024-10-14

## 2025-03-24 ENCOUNTER — OFFICE VISIT (OUTPATIENT)
Dept: INTERNAL MEDICINE CLINIC | Facility: CLINIC | Age: 25
End: 2025-03-24
Payer: COMMERCIAL

## 2025-03-24 VITALS
DIASTOLIC BLOOD PRESSURE: 78 MMHG | BODY MASS INDEX: 21.47 KG/M2 | WEIGHT: 167.2 LBS | OXYGEN SATURATION: 98 % | SYSTOLIC BLOOD PRESSURE: 140 MMHG | HEART RATE: 54 BPM

## 2025-03-24 DIAGNOSIS — T75.3XXA MOTION SICKNESS, INITIAL ENCOUNTER: ICD-10-CM

## 2025-03-24 DIAGNOSIS — R11.0 NAUSEA: Primary | ICD-10-CM

## 2025-03-24 PROCEDURE — 99213 OFFICE O/P EST LOW 20 MIN: CPT | Performed by: INTERNAL MEDICINE

## 2025-03-24 RX ORDER — SCOPOLAMINE 1 MG/3D
1 PATCH, EXTENDED RELEASE TRANSDERMAL
Qty: 4 PATCH | Refills: 0 | Status: SHIPPED | OUTPATIENT
Start: 2025-03-24

## 2025-03-24 NOTE — ASSESSMENT & PLAN NOTE
-Patient will be given a prescription for scopolamine patches.  Discussed that the patch can be worn for 72 hours prior to removal.  Discussed potential side effects associated with the patch.  Orders:  •  scopolamine (TRANSDERM-SCOP) 1 mg/3 days TD 72 hr patch; Place 1 patch on the skin over 72 hours every third day

## 2025-03-26 LAB — EXTERNAL HIV SCREEN: NORMAL

## 2025-04-03 ENCOUNTER — OFFICE VISIT (OUTPATIENT)
Dept: INTERNAL MEDICINE CLINIC | Facility: CLINIC | Age: 25
End: 2025-04-03
Payer: COMMERCIAL

## 2025-04-03 VITALS
DIASTOLIC BLOOD PRESSURE: 78 MMHG | HEIGHT: 74 IN | BODY MASS INDEX: 21.58 KG/M2 | SYSTOLIC BLOOD PRESSURE: 124 MMHG | HEART RATE: 61 BPM | WEIGHT: 168.2 LBS | OXYGEN SATURATION: 99 %

## 2025-04-03 DIAGNOSIS — S99.922A TOE INJURY, LEFT, INITIAL ENCOUNTER: Primary | ICD-10-CM

## 2025-04-03 DIAGNOSIS — Z23 ENCOUNTER FOR IMMUNIZATION: ICD-10-CM

## 2025-04-03 DIAGNOSIS — Z00.00 ANNUAL PHYSICAL EXAM: ICD-10-CM

## 2025-04-03 PROCEDURE — 99395 PREV VISIT EST AGE 18-39: CPT

## 2025-04-03 PROCEDURE — 90714 TD VACC NO PRESV 7 YRS+ IM: CPT

## 2025-04-03 PROCEDURE — 90471 IMMUNIZATION ADMIN: CPT

## 2025-04-03 NOTE — PROGRESS NOTES
Adult Annual Physical  Name: Remy Bishop      : 2000      MRN: [id number]  Encounter Provider: Carrie Barkley MD  Encounter Date: 4/3/2025   Encounter department: [facility]    :  Assessment & Plan  Annual physical exam  Patient is a 25-year-old male without any significant past medical history who presents for annual visit.       Toe injury, left, initial encounter  Patient endorses left fourth and fifth toe pain onset 2 days ago after stubbing his toe on his bed frame  No over-the-counter treatments tried  Patient ambulating without any difficulty and is neurovascularly intact  Continue supportive care with over-the-counter Tylenol or ibuprofen PRN, elevation, and ice as needed.  Will defer imaging at this time.       Encounter for immunization    Orders:    TD VACCINE GREATER THAN OR EQUAL TO 6YO PRESERVATIVE FREE IM        Preventive Screenings:    - Lung cancer screening: screening not indicated   - Prostate cancer screening: screening not indicated     Immunizations:  - Immunizations due: Influenza and Tdap      Depression Screening and Follow-up Plan: Patient was screened for depression during today's encounter. They screened negative with a PHQ-2 score of 0.          History of Present Illness         Adult Annual Physical:  Patient presents for annual physical.     Diet and Physical Activity:  - Diet/Nutrition: no special diet.  - Exercise: vigorous cardiovascular exercise, 3-4 times a week on average and 30-60 minutes on average.    Depression Screening:  - PHQ-2 Score: 0    General Health:  - Sleep: 4-6 hours of sleep on average.  - Hearing: normal hearing bilateral ears.  - Vision: no vision problems and most recent eye exam > 1 year ago.  - Dental: no dental visits for > 1 year, brushes teeth twice daily and floss regularly.    /GYN Health:  - Follows with GYN: no.   - History of STDs: yes     Health:  - History of STDs: yes.   - Urinary symptoms: none.  "    Advanced Care Planning:  - Has an advanced directive?: no    - Has a durable medical POA?: no        Remy Bishop is 24 y/o male without significant past medical hx who presents for annual visit.  He endorses left fourth and fifth toe pain, swelling, and bruising onset 2 days ago after accidentally kicking his bed frame.  No over-the-counter medications or ice tried. Patient states pain has improved.  He has been able to ambulate without difficulty.  Patient is otherwise asymptomatic without any complaints.  Denies any headache, fevers, chills, chest pain, shortness of breath, nausea, vomiting, bowel dysfunction, urinary symptoms.    Review of Systems   Constitutional:  Negative for chills and fever.   HENT:  Negative for sore throat.    Respiratory:  Negative for cough and shortness of breath.    Cardiovascular:  Negative for chest pain, palpitations and leg swelling.   Gastrointestinal:  Negative for abdominal pain, constipation, diarrhea, nausea and vomiting.   Genitourinary:  Negative for decreased urine volume, difficulty urinating, frequency and urgency.   Musculoskeletal:  Positive for myalgias. Negative for arthralgias and back pain.        Left fourth toe pain, swelling, bruising   Skin:  Positive for color change.   Neurological:  Negative for dizziness, weakness, numbness and headaches.         Objective   /78 (BP Location: Left arm, Patient Position: Sitting, Cuff Size: Standard)   Pulse 61   Ht 6' 2\" (1.88 m)   Wt 76.3 kg (168 lb 3.2 oz)   SpO2 99%   BMI 21.60 kg/m²     Physical Exam  Vitals and nursing note reviewed.   Constitutional:       Appearance: Normal appearance.   HENT:      Head: Normocephalic and atraumatic.      Mouth/Throat:      Mouth: Mucous membranes are moist.   Eyes:      Extraocular Movements: Extraocular movements intact.      Conjunctiva/sclera: Conjunctivae normal.      Pupils: Pupils are equal, round, and reactive to light.   Cardiovascular:      Rate and Rhythm: " Normal rate and regular rhythm.   Pulmonary:      Effort: Pulmonary effort is normal. No respiratory distress.      Breath sounds: Normal breath sounds.   Abdominal:      General: Bowel sounds are normal. There is no distension.      Palpations: Abdomen is soft.      Tenderness: There is no abdominal tenderness. There is no guarding.   Musculoskeletal:      Comments: Left fourth toe with mild swelling, tenderness, and ecchymosis.  Range of motion and sensation intact.  Normal capillary refill.   Skin:     General: Skin is warm and dry.   Neurological:      General: No focal deficit present.      Mental Status: He is alert. Mental status is at baseline.   Psychiatric:         Mood and Affect: Mood normal.

## 2025-04-03 NOTE — PATIENT INSTRUCTIONS
"Patient Education     Routine physical for adults   The Basics   Written by the doctors and editors at [facility]   What is a physical? -- A physical is a routine visit, or \"check-up,\" with your doctor. You might also hear it called a \"wellness visit\" or \"preventive visit.\"  During each visit, the doctor will:   Ask about your physical and mental health   Ask about your habits, behaviors, and lifestyle   Do an exam   Give you vaccines if needed   Talk to you about any medicines you take   Give advice about your health   Answer your questions  Getting regular check-ups is an important part of taking care of your health. It can help your doctor find and treat any problems you have. But it's also important for preventing health problems.  A routine physical is different from a \"sick visit.\" A sick visit is when you see a doctor because of a health concern or problem. Since physicals are scheduled ahead of time, you can think about what you want to ask the doctor.  How often should I get a physical? -- It depends on your age and health. In general, for people age 21 years and older:   If you are younger than 50 years, you might be able to get a physical every 3 years.   If you are 50 years or older, your doctor might recommend a physical every year.  If you have an ongoing health condition, like diabetes or high blood pressure, your doctor will probably want to see you more often.  What happens during a physical? -- In general, each visit will include:   Physical exam - The doctor or nurse will check your height, weight, heart rate, and blood pressure. They will also look at your eyes and ears. They will ask about how you are feeling and whether you have any symptoms that bother you.   Medicines - It's a good idea to bring a list of all the medicines you take to each doctor visit. Your doctor will talk to you about your medicines and answer any questions. Tell them if you are having any side effects that bother you. You " "should also tell them if you are having trouble paying for any of your medicines.   Habits and behaviors - This includes:   Your diet   Your exercise habits   Whether you smoke, drink alcohol, or use drugs   Whether you are sexually active   Whether you feel safe at home  Your doctor will talk to you about things you can do to improve your health and lower your risk of health problems. They will also offer help and support. For example, if you want to quit smoking, they can give you advice and might prescribe medicines. If you want to improve your diet or get more physical activity, they can help you with this, too.   Lab tests, if needed - The tests you get will depend on your age and situation. For example, your doctor might want to check your:   Cholesterol   Blood sugar   Iron level   Vaccines - The recommended vaccines will depend on your age, health, and what vaccines you already had. Vaccines are very important because they can prevent certain serious or deadly infections.   Discussion of screening - \"Screening\" means checking for diseases or other health problems before they cause symptoms. Your doctor can recommend screening based on your age, risk, and preferences. This might include tests to check for:   Cancer, such as breast, prostate, cervical, ovarian, colorectal, prostate, lung, or skin cancer   Sexually transmitted infections, such as chlamydia and gonorrhea   Mental health conditions like depression and anxiety  Your doctor will talk to you about the different types of screening tests. They can help you decide which screenings to have. They can also explain what the results might mean.   Answering questions - The physical is a good time to ask the doctor or nurse questions about your health. If needed, they can refer you to other doctors or specialists, too.  Adults older than 65 years often need other care, too. As you get older, your doctor will talk to you about:   How to prevent falling at " home   Hearing or vision tests   Memory testing   How to take your medicines safely   Making sure that you have the help and support you need at home  All topics are updated as new evidence becomes available and our peer review process is complete.  This topic retrieved from [employer] on: May 02, 2024.  Topic 605925 Version 1.0  Release: 32.4.3 - C32.122  © 2024 [employer] and/or its affiliates. All rights reserved.  Consumer Information Use and Disclaimer   Disclaimer: This generalized information is a limited summary of diagnosis, treatment, and/or medication information. It is not meant to be comprehensive and should be used as a tool to help the user understand and/or assess potential diagnostic and treatment options. It does NOT include all information about conditions, treatments, medications, side effects, or risks that may apply to a specific patient. It is not intended to be medical advice or a substitute for the medical advice, diagnosis, or treatment of a health care provider based on the health care provider's examination and assessment of a patient's specific and unique circumstances. Patients must speak with a health care provider for complete information about their health, medical questions, and treatment options, including any risks or benefits regarding use of medications. This information does not endorse any treatments or medications as safe, effective, or approved for treating a specific patient. [employer] and its affiliates disclaim any warranty or liability relating to this information or the use thereof.The use of this information is governed by the Terms of Use, available at https://www.[employer].com/en/know/clinical-effectiveness-terms. 2024© [employer], [employer]. and its affiliates and/or licensors. All rights reserved.  Copyright   © 2024 [employer] and/or its affiliates. All rights reserved.